# Patient Record
Sex: MALE | URBAN - METROPOLITAN AREA
[De-identification: names, ages, dates, MRNs, and addresses within clinical notes are randomized per-mention and may not be internally consistent; named-entity substitution may affect disease eponyms.]

---

## 2021-05-26 ENCOUNTER — OFFICE VISIT (OUTPATIENT)
Dept: URBAN - METROPOLITAN AREA CLINIC 30 | Facility: CLINIC | Age: 66
End: 2021-05-26

## 2021-05-26 DIAGNOSIS — H53.19 OTHER SUBJECTIVE VISUAL DISTURBANCES: Primary | ICD-10-CM

## 2021-05-26 PROCEDURE — 99203 OFFICE O/P NEW LOW 30 MIN: CPT | Performed by: OPTOMETRIST

## 2021-05-26 ASSESSMENT — INTRAOCULAR PRESSURE
OD: 17
OS: 16

## 2021-05-26 NOTE — IMPRESSION/PLAN
Impression: Other subjective visual disturbances: H53.19. Plan: Constantly seeing bars in left field of vision, pt thinks OS. Onset 1 week. Pt reassured retinas are flat and attached 360 OU. No diabetic retinopathy.
 h/o HTN, was off meds due to side effects of lisinopril. Pt notes one episode ~ 1 month ago, of seeing colored lights for ~ 15-20 minutes. Advised patient to immediately proceed to ER if any LOV/blackout occurs. S/sx TIA/CVA reviewed. RTC for f/u c 30-2 VF in future.